# Patient Record
(demographics unavailable — no encounter records)

---

## 2017-12-23 NOTE — REP
Clinical:  Abdominal pain and constipation times 1 week.

 

Technique:  Single supine view of the abdomen and pelvis.

 

Findings:

Moderate fecal stasis is suggested without evidence for bowel obstruction or

perforation.  No organomegaly.  No abnormal calcifications.  Skeletal structures

are intact and normal for age.

 

Impression:

Fecal stasis.

 

 

Signed by

Joe Francisco MD 12/23/2017 08:14 A